# Patient Record
Sex: FEMALE | Race: WHITE | NOT HISPANIC OR LATINO | Employment: UNEMPLOYED | URBAN - NONMETROPOLITAN AREA
[De-identification: names, ages, dates, MRNs, and addresses within clinical notes are randomized per-mention and may not be internally consistent; named-entity substitution may affect disease eponyms.]

---

## 2017-02-09 ENCOUNTER — OFFICE VISIT (OUTPATIENT)
Dept: GASTROENTEROLOGY | Facility: CLINIC | Age: 27
End: 2017-02-09

## 2017-02-09 VITALS
DIASTOLIC BLOOD PRESSURE: 75 MMHG | BODY MASS INDEX: 25.27 KG/M2 | OXYGEN SATURATION: 97 % | WEIGHT: 148 LBS | SYSTOLIC BLOOD PRESSURE: 114 MMHG | HEIGHT: 64 IN | HEART RATE: 66 BPM

## 2017-02-09 DIAGNOSIS — R19.7 DIARRHEA, UNSPECIFIED TYPE: ICD-10-CM

## 2017-02-09 DIAGNOSIS — R53.83 MALAISE AND FATIGUE: Primary | ICD-10-CM

## 2017-02-09 DIAGNOSIS — K62.5 HEMORRHAGE OF ANUS AND RECTUM: ICD-10-CM

## 2017-02-09 DIAGNOSIS — R10.84 GENERALIZED ABDOMINAL PAIN: ICD-10-CM

## 2017-02-09 DIAGNOSIS — R53.81 MALAISE AND FATIGUE: Primary | ICD-10-CM

## 2017-02-09 PROCEDURE — 99244 OFF/OP CNSLTJ NEW/EST MOD 40: CPT | Performed by: INTERNAL MEDICINE

## 2017-02-09 RX ORDER — NORETHINDRONE AND ETHINYL ESTRADIOL 7 DAYS X 3
KIT ORAL
Refills: 3 | COMMUNITY
Start: 2017-01-24

## 2017-02-09 RX ORDER — IBUPROFEN 800 MG/1
800 TABLET ORAL EVERY 6 HOURS PRN
COMMUNITY
End: 2021-11-10

## 2017-02-09 RX ORDER — RANITIDINE 150 MG/1
TABLET ORAL
Refills: 4 | COMMUNITY
Start: 2016-11-02

## 2017-02-09 NOTE — PATIENT INSTRUCTIONS
BRANDIN OZUNA D.O.  Board Certified in Gastroenterology    Miralax Colonoscopy One Day Prep    Do these things 7 DAYS BEFORE the procedure:  • Arrange a ride: You will be given medicine that makes you relax and be sleepy, so you cannot drive a car or take a bus home. If you arrive without an escort, your procedure may need to be rescheduled.   • Stop taking Iron and Vitamin E  • If you are taking Coumadin, Plavix, and/or Warfarin or if you are a diabetic, call your doctor for special instructions.  • Do not eat any seeds, popcorn or nuts.  • Please inform the physician of any history of heart murmurs, valve replacement, heart or lung conditions, or if you have had any adverse reactions to anesthesia.    Do these things 3 DAYS BEFORE the procedure:  • Confirm your ride.  • If you need to cancel your appointment, call your doctor.   • Review the diet you need to follow for the next two days. Plan your meals according to the clear liquid diet.    · Start Linzess Saturday once daily 30-45mins before meal.    Do these things 1 DAY BEFORE the procedure:  • Beginning at breakfast and lasting until midnight, start a clear liquid diet, a clear liquid diet can be found below.  • It is very important that you increase your fluids throughout the day.  • 8:00 PM - Mix 32 ounces of a pre-warmed liquid of your choice, such as lemon flavored Crystal Light, apple juice, or Gatorade (except red, blue, green, or purple) with 15 capfuls of MiraLax. Mix well until the MiraLax has dissolved. DO NOT REFRIGERATE (refrigeration will cause the MiraLax to not dissolve completely.) You may pour each glass over ice after the MiraLax has been dissolved if you prefer to drink it cold. Drink entire mixture in 1 hour.     Do these things ON THE DAY OF the procedure:   • 6 hours prior to arrival mix 15 capfuls with 32 ounces of liquid and drink entire mixture in 1 hour.  • You may take any essential medications with a small amount of  water, otherwise do not eat or drink anything on the morning of your procedure. Please contact your Primary Care Physician for the insulin dose, if applicable.  • Once again, it is very important for you to bring someone with you at the time of your procedure to drive you home.  • Nothing to eat or drink 4 hours prior to procedure.    **REMINDERS**  **Please note: If you experience nausea, you may take Phenergan and/or Zofran. 1 tablet every 4-6 hours as needed.   ** Before presenting for your EGD or Colonoscopy you will need to have a shower/bath.         Clear Liquid Diet  This diet provides fluids that leave little residue and are easily absorbed with minimal digestive activity. This diet is inadequate in all essential nutrients and is recommended only if clear liquids are temporarily needed. No red, purple, blue or green liquids should be consumed.    FOOD GROUP FOODS ALLOWED FOODS TO AVOID   Milk & Beverages  No red or purple liquids! Tea, coffee, carbonated or fruit flavored drinks Milk, milk drinks   Meat & Meat substances None All   Vegetables None All   Fruits & Fruit Juices Strained fruit juices: apple, white grape, lemonade Fruit juices with fruit pulp   Grains & Starches None All   Soups Clear broth, consommé, Beef, chicken, vegetable broth All others   Desserts Clear flavored gelatin or popsicles. No red, purple, blue and green.  All others   Fats None All   Miscellaneous Sugar, honey, syrup, clear hard candy, salt All others       Breakfast Lunch Dinner   4 oz White grape juice 4 oz Apple juice 4 oz Lemonade   6 oz clear broth 6 oz Clear broth 6 oz Clear Broth   Jell-O* Jell-O* Jell-O*   Tea or Coffee Tea or Coffee Tea or Coffee   *Plain only, no fruit or toppings.    Procedure is at [facility] Date:   2/15/17       Arrival Time:     800am       Patients states to understand and to be compliant with all instructions discussed.       Thanks,  RICK Estevez

## 2017-02-09 NOTE — PROGRESS NOTES
: 1990    Chief Complaint   Patient presents with   • Abdominal Pain       Pema Luna is a 26 y.o. female who presents to the office today as a consultation from JOVON Lloyd for evaluation of Abdominal Pain    History of Present Illness:    Pema Luna presents for evaluation of abdominal pain at the request of Analisa SIGALA.  Reports in on Labor Day weekend she developed flulike symptoms with nausea, vomiting, body aches and generalized malaise.  She was evaluated and found to have no flu or strep throat.  By 11 PM that evening she was having severe diarrhea, severe abdominal pain and was concerned that she had had some food poisoning.  She was placed on Flagyl and Levaquin.     A month and a half ago she developed a severe attack with sharp stabbing pain that she rated as 6-8 out of 10, she continued to have pain and diarrhea with blood in her stools.  She noted that she also had heartburn and bloating, oral ulcers and hair loss.  She was tested and found that her vitamin D was low.  Recently she has has had 8-9 bowel movements per day.  On a normal day she would have 1-2 soft stools or diarrhea.  She will have episodes that she feels like she has to go to the bathroom it is unable to pass any stool.  She feels with change in bowel habits and her unclear diagnosis of possible Crohn's disease she would like another colonoscopy.    She was seen and [facility] 2017.  She underwent an IBD panel, and Thiopurine levels.  CBC on the same date was within normal levels, sedimentation rate 23, CMP revealed AST 11, slightly low.  She underwent a colonoscopy on 2016 that revealed transverse colon biopsy with mild active colitis with mild crypt distortion and focal cryptitis (the microscopic findings raise the possibility and suggestive of inflammatory bowel disease; however, other differential diagnosis includes an infectious colitis and drug-related  colitis; right colon biopsy showed mild active chronic colitis with cryptitis, crypt abscess and mild crypt distortion; rectal biopsy showed focal acute inflammation.  Hemoccults stools were negative, there were no white blood cells noted, several Reggie positive cocci in clusters and rare gram-positive rods.  On 9/3/2016, she underwent HIDA scan at [facility] in [facility] that revealed a gallbladder ejection fraction decreased at 20%.  Her stools were sent to the [facility] for evaluation on September 6, 2016 that revealed no parasites, leukocytes moderate, no cryptosporidium, Cyclospora or microspora; serum beta hCG was negative.  During the same time she was checked for C. difficile toxins in her stool which were negative.  The pathology for her gallbladder on September 8, 2016 revealed chronic cholecystitis.    He was seen at [facility] on December 8, 2016 that revealed the following: iron 172, TIBC 554, iron saturation 31%, C-reactive protein less than 0.2, CMP revealed total protein 8.3/6.4-8.2.    CBC on September 5, 2016 revealed hemoglobin of 10.6 and hematocrit 32.6, vitamin B12 to 19,      CT of the abdomen and pelvis with contrast at [facility] on September 3, 2016, CT of the abdomen and pelvis with contrast revealed findings initially to suggest colitis within the right questionable similar findings in the left but not marked.  There is a 2.4 cm cyst in the left kidney                         Review of Systems   Constitutional: Positive for appetite change, fatigue and unexpected weight change. Negative for chills and fever.   HENT: Positive for mouth sores. Negative for hearing loss and nosebleeds.    Eyes: Negative for itching and visual disturbance.   Respiratory: Negative for cough, chest tightness, shortness of breath and wheezing.    Cardiovascular: Negative for chest pain, palpitations and leg swelling.   Endocrine: Negative for cold  intolerance, heat intolerance, polydipsia and polyuria.   Genitourinary: Negative for dysuria, frequency and hematuria.   Musculoskeletal: Positive for arthralgias and myalgias. Negative for joint swelling.   Skin: Negative for rash and wound.   Allergic/Immunologic: Negative for food allergies and immunocompromised state.   Neurological: Negative for seizures, syncope, weakness and light-headedness.   Hematological: Negative for adenopathy. Does not bruise/bleed easily.   Psychiatric/Behavioral: Negative for confusion and sleep disturbance. The patient is not nervous/anxious.    Gastrointestinal: Positive for abdominal pain, anal bleeding, black stools, blood in stool, change in bowel habits, constipation, diarrhea, gas/bloating and heartburn.    History reviewed. No pertinent past medical history.    Past Surgical History   Procedure Laterality Date   • Colonoscopy     • Cholecystectomy         Family History   Problem Relation Age of Onset   • No Known Problems Mother    • No Known Problems Father    • Celiac disease Sister    • Diabetes Maternal Grandmother    • Hypertension Maternal Grandmother    • Lung cancer Maternal Grandfather    • Breast cancer Paternal Grandmother    • Colon cancer Neg Hx    • Colon polyps Neg Hx    • Liver disease Neg Hx        History   Smoking Status   • Never Smoker   Smokeless Tobacco   • Not on file     History   Alcohol Use   • Yes     Comment: occasional     History   Drug Use No     Marital Status:       Current Outpatient Prescriptions:   •  ibuprofen (ADVIL,MOTRIN) 800 MG tablet, Take 800 mg by mouth Every 6 (Six) Hours As Needed for mild pain (1-3)., Disp: , Rfl:   •  LORATADINE ALLERGY RELIEF PO, Take  by mouth., Disp: , Rfl:   •  NORTREL 7/7/7 0.5/0.75/1-35 MG-MCG per tablet, TAKE 1 TABLET EVERY DAY, Disp: , Rfl: 3  •  raNITIdine (ZANTAC) 150 MG tablet, TAKE 1 TABLET BY MOUTH TWICE A DAY, Disp: , Rfl: 4  •  bisacodyl (DULCOLAX) 5 MG EC tablet, Take 4 tablets at 8am  "with a full glass of water., Disp: 4 tablet, Rfl: 0  •  ondansetron (ZOFRAN) 4 MG tablet, Take 1 tablet PRN nausea every 4 hours., Disp: 5 tablet, Rfl: 0  •  polyethylene glycol (MIRALAX) packet, Take 255g of Miralax with 32 oz clear liquid at 8pm the night before the procedure. Repeat 255g Miralax 6 hrs prior to your procedure., Disp: 510 g, Rfl: 0    Allergies:   Review of patient's allergies indicates no known allergies.    Visit Vitals   • /75   • Pulse 66   • Ht 64\" (162.6 cm)   • Wt 148 lb (67.1 kg)   • SpO2 97%   • BMI 25.4 kg/m2       Physical Exam   Constitutional: She is oriented to person, place, and time. She appears well-developed and well-nourished. No distress.   HENT:   Head: Normocephalic and atraumatic.   Right Ear: External ear normal.   Left Ear: External ear normal.   Nose: Nose normal.   Mouth/Throat: Oropharynx is clear and moist.   Eyes: Conjunctivae and EOM are normal. Right eye exhibits no discharge. Left eye exhibits no discharge. No scleral icterus.   Neck: Normal range of motion. Neck supple.   Cardiovascular: Normal rate, regular rhythm and normal heart sounds.  Exam reveals no gallop and no friction rub.    No murmur heard.  Pulmonary/Chest: Effort normal and breath sounds normal. No respiratory distress. She has no wheezes. She has no rales. She exhibits no tenderness.   Abdominal: Soft. Normal appearance and bowel sounds are normal. She exhibits no distension, no ascites and no mass. There is no tenderness. There is no rigidity and no guarding. No hernia.   Musculoskeletal: Normal range of motion. She exhibits no edema or deformity.   Neurological: She is alert and oriented to person, place, and time. She exhibits normal muscle tone. Coordination normal.   Skin: Skin is warm and dry. No rash noted. No erythema. No pallor.   Psychiatric: She has a normal mood and affect. Her behavior is normal. Judgment and thought content normal.   Nursing note and vitals " reviewed.      Assessment/Plan:  Diagnoses and all orders for this visit:    Malaise and fatigue  -     Iron Profile; Future  -     Vitamin D 1,25 Dihydroxy  -     TSH; Future  -     T4, free; Future  Diarrhea, unspecified type  -     Iron Profile; Future  -     Vitamin D 1,25 Dihydroxy  -     TSH; Future  -     T4, free; Future  Hemorrhage of anus and rectum  -     Iron Profile; Future  -     Vitamin D 1,25 Dihydroxy  -     TSH; Future  -     T4, free; Future    Generalized abdominal pain  -     Iron Profile; Future  -     Vitamin D 1,25 Dihydroxy  -     TSH; Future  -     T4, free; Future    · She will need a colonoscopy performed with IV general sedation. All of the risks, benefits and alternatives of this procedure have been discussed with her, all of her questions have been answered and she has elected to proceed. She should follow up in the office after this procedure to discuss the results and further recommendations can be made at that time.  · She will continue current medications.  · I discussed the patient's findings and my recommendations with the patient. All of their questions were answered to their satisfaction and they understand the plan.   · She will call with any interval concerns.       Return for next scheduled follow up after procedure.            Electronically signed by: Maria Del Carmen Puentes D.O. 2/9/2017 at 2:18 PM  .

## 2017-02-15 RX ORDER — ONDANSETRON 4 MG/1
TABLET, FILM COATED ORAL
Qty: 5 TABLET | Refills: 0 | Status: SHIPPED | OUTPATIENT
Start: 2017-02-15

## 2017-02-15 RX ORDER — POLYETHYLENE GLYCOL 3350 17 G/17G
POWDER, FOR SOLUTION ORAL
Qty: 510 G | Refills: 0 | Status: SHIPPED | OUTPATIENT
Start: 2017-02-15

## 2017-02-22 ENCOUNTER — ANESTHESIA (OUTPATIENT)
Dept: PERIOP | Facility: HOSPITAL | Age: 27
End: 2017-02-22

## 2017-02-22 ENCOUNTER — ANESTHESIA EVENT (OUTPATIENT)
Dept: PERIOP | Facility: HOSPITAL | Age: 27
End: 2017-02-22

## 2017-02-22 ENCOUNTER — HOSPITAL ENCOUNTER (OUTPATIENT)
Facility: HOSPITAL | Age: 27
Setting detail: HOSPITAL OUTPATIENT SURGERY
Discharge: HOME | End: 2017-02-22
Attending: INTERNAL MEDICINE | Admitting: INTERNAL MEDICINE

## 2017-02-22 VITALS
HEART RATE: 65 BPM | DIASTOLIC BLOOD PRESSURE: 61 MMHG | RESPIRATION RATE: 20 BRPM | OXYGEN SATURATION: 100 % | HEIGHT: 65 IN | TEMPERATURE: 98 F | SYSTOLIC BLOOD PRESSURE: 100 MMHG | WEIGHT: 148 LBS | BODY MASS INDEX: 24.66 KG/M2

## 2017-02-22 DIAGNOSIS — R10.84 GENERALIZED ABDOMINAL PAIN: ICD-10-CM

## 2017-02-22 DIAGNOSIS — R53.83 MALAISE AND FATIGUE: ICD-10-CM

## 2017-02-22 DIAGNOSIS — R53.81 MALAISE AND FATIGUE: ICD-10-CM

## 2017-02-22 DIAGNOSIS — R19.7 DIARRHEA, UNSPECIFIED TYPE: ICD-10-CM

## 2017-02-22 DIAGNOSIS — K62.5 HEMORRHAGE OF ANUS AND RECTUM: ICD-10-CM

## 2017-02-22 LAB
B-HCG UR QL: NEGATIVE
INTERNAL NEGATIVE CONTROL: NEGATIVE
INTERNAL POSITIVE CONTROL: POSITIVE
Lab: NORMAL

## 2017-02-22 PROCEDURE — 25010000002 PROPOFOL 10 MG/ML EMULSION: Performed by: NURSE ANESTHETIST, CERTIFIED REGISTERED

## 2017-02-22 PROCEDURE — 25010000002 FENTANYL CITRATE (PF) 100 MCG/2ML SOLUTION: Performed by: NURSE ANESTHETIST, CERTIFIED REGISTERED

## 2017-02-22 PROCEDURE — 25010000002 PROPOFOL 1000 MG/ML EMULSION: Performed by: NURSE ANESTHETIST, CERTIFIED REGISTERED

## 2017-02-22 PROCEDURE — 25010000002 MIDAZOLAM PER 1 MG: Performed by: NURSE ANESTHETIST, CERTIFIED REGISTERED

## 2017-02-22 PROCEDURE — 45380 COLONOSCOPY AND BIOPSY: CPT | Performed by: INTERNAL MEDICINE

## 2017-02-22 RX ORDER — ONDANSETRON 2 MG/ML
4 INJECTION INTRAMUSCULAR; INTRAVENOUS ONCE AS NEEDED
Status: DISCONTINUED | OUTPATIENT
Start: 2017-02-22 | End: 2017-02-22 | Stop reason: HOSPADM

## 2017-02-22 RX ORDER — TRIAMCINOLONE ACETONIDE 1 MG/G
CREAM TOPICAL
Refills: 3 | COMMUNITY
Start: 2016-11-15

## 2017-02-22 RX ORDER — LIDOCAINE HYDROCHLORIDE 20 MG/ML
INJECTION, SOLUTION INFILTRATION; PERINEURAL AS NEEDED
Status: DISCONTINUED | OUTPATIENT
Start: 2017-02-22 | End: 2017-02-22 | Stop reason: SURG

## 2017-02-22 RX ORDER — SODIUM CHLORIDE 0.9 % (FLUSH) 0.9 %
1-10 SYRINGE (ML) INJECTION AS NEEDED
Status: DISCONTINUED | OUTPATIENT
Start: 2017-02-22 | End: 2017-02-22 | Stop reason: HOSPADM

## 2017-02-22 RX ORDER — FENTANYL CITRATE 50 UG/ML
50 INJECTION, SOLUTION INTRAMUSCULAR; INTRAVENOUS
Status: DISCONTINUED | OUTPATIENT
Start: 2017-02-22 | End: 2017-02-22 | Stop reason: HOSPADM

## 2017-02-22 RX ORDER — OXYCODONE HYDROCHLORIDE AND ACETAMINOPHEN 5; 325 MG/1; MG/1
1 TABLET ORAL ONCE AS NEEDED
Status: DISCONTINUED | OUTPATIENT
Start: 2017-02-22 | End: 2017-02-22 | Stop reason: HOSPADM

## 2017-02-22 RX ORDER — FENTANYL CITRATE 50 UG/ML
INJECTION, SOLUTION INTRAMUSCULAR; INTRAVENOUS AS NEEDED
Status: DISCONTINUED | OUTPATIENT
Start: 2017-02-22 | End: 2017-02-22 | Stop reason: SURG

## 2017-02-22 RX ORDER — MIDAZOLAM HYDROCHLORIDE 1 MG/ML
INJECTION INTRAMUSCULAR; INTRAVENOUS AS NEEDED
Status: DISCONTINUED | OUTPATIENT
Start: 2017-02-22 | End: 2017-02-22 | Stop reason: SURG

## 2017-02-22 RX ORDER — CYANOCOBALAMIN 1000 UG/ML
INJECTION, SOLUTION INTRAMUSCULAR; SUBCUTANEOUS
Refills: 0 | COMMUNITY
Start: 2017-01-24

## 2017-02-22 RX ORDER — PROPOFOL 10 MG/ML
VIAL (ML) INTRAVENOUS AS NEEDED
Status: DISCONTINUED | OUTPATIENT
Start: 2017-02-22 | End: 2017-02-22 | Stop reason: SURG

## 2017-02-22 RX ORDER — SODIUM CHLORIDE, SODIUM LACTATE, POTASSIUM CHLORIDE, CALCIUM CHLORIDE 600; 310; 30; 20 MG/100ML; MG/100ML; MG/100ML; MG/100ML
125 INJECTION, SOLUTION INTRAVENOUS CONTINUOUS
Status: DISCONTINUED | OUTPATIENT
Start: 2017-02-22 | End: 2017-02-22 | Stop reason: HOSPADM

## 2017-02-22 RX ORDER — IPRATROPIUM BROMIDE AND ALBUTEROL SULFATE 2.5; .5 MG/3ML; MG/3ML
3 SOLUTION RESPIRATORY (INHALATION) ONCE AS NEEDED
Status: DISCONTINUED | OUTPATIENT
Start: 2017-02-22 | End: 2017-02-22 | Stop reason: HOSPADM

## 2017-02-22 RX ORDER — MEPERIDINE HYDROCHLORIDE 25 MG/ML
12.5 INJECTION INTRAMUSCULAR; INTRAVENOUS; SUBCUTANEOUS
Status: DISCONTINUED | OUTPATIENT
Start: 2017-02-22 | End: 2017-02-22 | Stop reason: HOSPADM

## 2017-02-22 RX ADMIN — SODIUM CHLORIDE, POTASSIUM CHLORIDE, SODIUM LACTATE AND CALCIUM CHLORIDE: 600; 310; 30; 20 INJECTION, SOLUTION INTRAVENOUS at 09:47

## 2017-02-22 RX ADMIN — LIDOCAINE HYDROCHLORIDE 60 MG: 20 INJECTION, SOLUTION INFILTRATION; PERINEURAL at 09:49

## 2017-02-22 RX ADMIN — PROPOFOL 50 MG: 10 INJECTION, EMULSION INTRAVENOUS at 09:49

## 2017-02-22 RX ADMIN — MIDAZOLAM HYDROCHLORIDE 2 MG: 1 INJECTION, SOLUTION INTRAMUSCULAR; INTRAVENOUS at 09:47

## 2017-02-22 RX ADMIN — PROPOFOL 150 MCG/KG/MIN: 10 INJECTION, EMULSION INTRAVENOUS at 09:49

## 2017-02-22 RX ADMIN — FENTANYL CITRATE 100 MCG: 50 INJECTION INTRAMUSCULAR; INTRAVENOUS at 09:47

## 2017-02-22 NOTE — ANESTHESIA POSTPROCEDURE EVALUATION
Patient: Pema Luna    Procedure Summary     Date Anesthesia Start Anesthesia Stop Room / Location    02/22/17 0947 1020  [facility] 07 / [facility]       Procedure Diagnosis Surgeon Provider    COLONOSCOPY CPT CODE: 42514 (N/A ) Generalized abdominal pain; Hemorrhage of anus and rectum; Malaise and fatigue; Diarrhea, unspecified type  (Generalized abdominal pain [R10.84]; Hemorrhage of anus and rectum [K62.5]; Malaise and fatigue [R53.81, R53.83]; Diarrhea, unspecified type [R19.7]) DO Adam Beltran MD          Anesthesia Type: general  Last vitals  BP      Temp      Pulse     Resp      SpO2        Post Anesthesia Care and Evaluation    Patient location during evaluation: PHASE II  Patient participation: complete - patient participated  Level of consciousness: awake and alert  Pain score: 1  Pain management: adequate  Airway patency: patent  Anesthetic complications: No anesthetic complications  PONV Status: controlled  Cardiovascular status: acceptable  Respiratory status: acceptable  Hydration status: acceptable

## 2017-02-22 NOTE — ANESTHESIA PREPROCEDURE EVALUATION
Anesthesia Evaluation     Patient summary reviewed and Nursing notes reviewed   no history of anesthetic complications:  NPO Status: > 6 hours   Airway   Mallampati: I  TM distance: >3 FB  Neck ROM: full  no difficulty expected  Dental - normal exam     Pulmonary - negative pulmonary ROS and normal exam   (-) asthma, not a smoker  Cardiovascular - negative cardio ROS and normal exam  Exercise tolerance: good (4-7 METS)    NYHA Classification: II    (-) hypertension, past MI, dysrhythmias, angina, CHF      Neuro/Psych- negative ROS  (-) seizures, CVA  GI/Hepatic/Renal/Endo    (+)  GERD,   (-) diabetes, hypothyroidism    Musculoskeletal (-) negative ROS    Abdominal  - normal exam    Bowel sounds: normal.   Substance History - negative use     OB/GYN negative ob/gyn ROS         Other - negative ROS       (-) arthritis                              Anesthesia Plan    ASA 2     general     intravenous induction   Anesthetic plan and risks discussed with patient and spouse/significant other.  Use of blood products discussed with patient and spouse/significant other  Consented to blood products.

## 2017-02-22 NOTE — PLAN OF CARE
Problem: Patient Care Overview (Adult)  Goal: Plan of Care Review  Outcome: Ongoing (interventions implemented as appropriate)    02/22/17 0959   Coping/Psychosocial Response Interventions   Plan Of Care Reviewed With patient   Patient Care Overview   Progress progress toward functional goals as expected

## 2017-02-22 NOTE — PLAN OF CARE
Problem: Patient Care Overview (Adult)  Goal: Discharge Needs Assessment  Outcome: Ongoing (interventions implemented as appropriate)    02/22/17 0951   Discharge Needs Assessment   Concerns To Be Addressed no discharge needs identified

## 2017-02-22 NOTE — PLAN OF CARE
Problem: GI Endoscopy (Adult)  Goal: Signs and Symptoms of Listed Potential Problems Will be Absent or Manageable (GI Endoscopy)  Outcome: Ongoing (interventions implemented as appropriate)    02/22/17 0951   GI Endoscopy   Problems Assessed (GI Endoscopy) all   Problems Present (GI Endoscopy) none

## 2017-02-22 NOTE — H&P (VIEW-ONLY)
: 1990    Chief Complaint   Patient presents with   • Abdominal Pain       Pema Luna is a 26 y.o. female who presents to the office today as a consultation from JOVON Lloyd for evaluation of Abdominal Pain    History of Present Illness:    Pema Luna presents for evaluation of abdominal pain at the request of Analisa SIGALA.  Reports in on Labor Day weekend she developed flulike symptoms with nausea, vomiting, body aches and generalized malaise.  She was evaluated and found to have no flu or strep throat.  By 11 PM that evening she was having severe diarrhea, severe abdominal pain and was concerned that she had had some food poisoning.  She was placed on Flagyl and Levaquin.     A month and a half ago she developed a severe attack with sharp stabbing pain that she rated as 6-8 out of 10, she continued to have pain and diarrhea with blood in her stools.  She noted that she also had heartburn and bloating, oral ulcers and hair loss.  She was tested and found that her vitamin D was low.  Recently she has has had 8-9 bowel movements per day.  On a normal day she would have 1-2 soft stools or diarrhea.  She will have episodes that she feels like she has to go to the bathroom it is unable to pass any stool.  She feels with change in bowel habits and her unclear diagnosis of possible Crohn's disease she would like another colonoscopy.    She was seen and [facility] 2017.  She underwent an IBD panel, and Thiopurine levels.  CBC on the same date was within normal levels, sedimentation rate 23, CMP revealed AST 11, slightly low.  She underwent a colonoscopy on 2016 that revealed transverse colon biopsy with mild active colitis with mild crypt distortion and focal cryptitis (the microscopic findings raise the possibility and suggestive of inflammatory bowel disease; however, other differential diagnosis includes an infectious colitis and drug-related  colitis; right colon biopsy showed mild active chronic colitis with cryptitis, crypt abscess and mild crypt distortion; rectal biopsy showed focal acute inflammation.  Hemoccults stools were negative, there were no white blood cells noted, several Reggie positive cocci in clusters and rare gram-positive rods.  On 9/3/2016, she underwent HIDA scan at [facility] in [facility] that revealed a gallbladder ejection fraction decreased at 20%.  Her stools were sent to the [facility] for evaluation on September 6, 2016 that revealed no parasites, leukocytes moderate, no cryptosporidium, Cyclospora or microspora; serum beta hCG was negative.  During the same time she was checked for C. difficile toxins in her stool which were negative.  The pathology for her gallbladder on September 8, 2016 revealed chronic cholecystitis.    He was seen at [facility] on December 8, 2016 that revealed the following: iron 172, TIBC 554, iron saturation 31%, C-reactive protein less than 0.2, CMP revealed total protein 8.3/6.4-8.2.    CBC on September 5, 2016 revealed hemoglobin of 10.6 and hematocrit 32.6, vitamin B12 to 19,      CT of the abdomen and pelvis with contrast at [facility] on September 3, 2016, CT of the abdomen and pelvis with contrast revealed findings initially to suggest colitis within the right questionable similar findings in the left but not marked.  There is a 2.4 cm cyst in the left kidney                         Review of Systems   Constitutional: Positive for appetite change, fatigue and unexpected weight change. Negative for chills and fever.   HENT: Positive for mouth sores. Negative for hearing loss and nosebleeds.    Eyes: Negative for itching and visual disturbance.   Respiratory: Negative for cough, chest tightness, shortness of breath and wheezing.    Cardiovascular: Negative for chest pain, palpitations and leg swelling.   Endocrine: Negative for cold  intolerance, heat intolerance, polydipsia and polyuria.   Genitourinary: Negative for dysuria, frequency and hematuria.   Musculoskeletal: Positive for arthralgias and myalgias. Negative for joint swelling.   Skin: Negative for rash and wound.   Allergic/Immunologic: Negative for food allergies and immunocompromised state.   Neurological: Negative for seizures, syncope, weakness and light-headedness.   Hematological: Negative for adenopathy. Does not bruise/bleed easily.   Psychiatric/Behavioral: Negative for confusion and sleep disturbance. The patient is not nervous/anxious.    Gastrointestinal: Positive for abdominal pain, anal bleeding, black stools, blood in stool, change in bowel habits, constipation, diarrhea, gas/bloating and heartburn.    History reviewed. No pertinent past medical history.    Past Surgical History   Procedure Laterality Date   • Colonoscopy     • Cholecystectomy         Family History   Problem Relation Age of Onset   • No Known Problems Mother    • No Known Problems Father    • Celiac disease Sister    • Diabetes Maternal Grandmother    • Hypertension Maternal Grandmother    • Lung cancer Maternal Grandfather    • Breast cancer Paternal Grandmother    • Colon cancer Neg Hx    • Colon polyps Neg Hx    • Liver disease Neg Hx        History   Smoking Status   • Never Smoker   Smokeless Tobacco   • Not on file     History   Alcohol Use   • Yes     Comment: occasional     History   Drug Use No     Marital Status:       Current Outpatient Prescriptions:   •  ibuprofen (ADVIL,MOTRIN) 800 MG tablet, Take 800 mg by mouth Every 6 (Six) Hours As Needed for mild pain (1-3)., Disp: , Rfl:   •  LORATADINE ALLERGY RELIEF PO, Take  by mouth., Disp: , Rfl:   •  NORTREL 7/7/7 0.5/0.75/1-35 MG-MCG per tablet, TAKE 1 TABLET EVERY DAY, Disp: , Rfl: 3  •  raNITIdine (ZANTAC) 150 MG tablet, TAKE 1 TABLET BY MOUTH TWICE A DAY, Disp: , Rfl: 4  •  bisacodyl (DULCOLAX) 5 MG EC tablet, Take 4 tablets at 8am  "with a full glass of water., Disp: 4 tablet, Rfl: 0  •  ondansetron (ZOFRAN) 4 MG tablet, Take 1 tablet PRN nausea every 4 hours., Disp: 5 tablet, Rfl: 0  •  polyethylene glycol (MIRALAX) packet, Take 255g of Miralax with 32 oz clear liquid at 8pm the night before the procedure. Repeat 255g Miralax 6 hrs prior to your procedure., Disp: 510 g, Rfl: 0    Allergies:   Review of patient's allergies indicates no known allergies.    Visit Vitals   • /75   • Pulse 66   • Ht 64\" (162.6 cm)   • Wt 148 lb (67.1 kg)   • SpO2 97%   • BMI 25.4 kg/m2       Physical Exam   Constitutional: She is oriented to person, place, and time. She appears well-developed and well-nourished. No distress.   HENT:   Head: Normocephalic and atraumatic.   Right Ear: External ear normal.   Left Ear: External ear normal.   Nose: Nose normal.   Mouth/Throat: Oropharynx is clear and moist.   Eyes: Conjunctivae and EOM are normal. Right eye exhibits no discharge. Left eye exhibits no discharge. No scleral icterus.   Neck: Normal range of motion. Neck supple.   Cardiovascular: Normal rate, regular rhythm and normal heart sounds.  Exam reveals no gallop and no friction rub.    No murmur heard.  Pulmonary/Chest: Effort normal and breath sounds normal. No respiratory distress. She has no wheezes. She has no rales. She exhibits no tenderness.   Abdominal: Soft. Normal appearance and bowel sounds are normal. She exhibits no distension, no ascites and no mass. There is no tenderness. There is no rigidity and no guarding. No hernia.   Musculoskeletal: Normal range of motion. She exhibits no edema or deformity.   Neurological: She is alert and oriented to person, place, and time. She exhibits normal muscle tone. Coordination normal.   Skin: Skin is warm and dry. No rash noted. No erythema. No pallor.   Psychiatric: She has a normal mood and affect. Her behavior is normal. Judgment and thought content normal.   Nursing note and vitals " reviewed.      Assessment/Plan:  Diagnoses and all orders for this visit:    Malaise and fatigue  -     Iron Profile; Future  -     Vitamin D 1,25 Dihydroxy  -     TSH; Future  -     T4, free; Future  Diarrhea, unspecified type  -     Iron Profile; Future  -     Vitamin D 1,25 Dihydroxy  -     TSH; Future  -     T4, free; Future  Hemorrhage of anus and rectum  -     Iron Profile; Future  -     Vitamin D 1,25 Dihydroxy  -     TSH; Future  -     T4, free; Future    Generalized abdominal pain  -     Iron Profile; Future  -     Vitamin D 1,25 Dihydroxy  -     TSH; Future  -     T4, free; Future    · She will need a colonoscopy performed with IV general sedation. All of the risks, benefits and alternatives of this procedure have been discussed with her, all of her questions have been answered and she has elected to proceed. She should follow up in the office after this procedure to discuss the results and further recommendations can be made at that time.  · She will continue current medications.  · I discussed the patient's findings and my recommendations with the patient. All of their questions were answered to their satisfaction and they understand the plan.   · She will call with any interval concerns.       Return for next scheduled follow up after procedure.            Electronically signed by: Maria Del Carmen Puentes D.O. 2/9/2017 at 2:18 PM  .

## 2017-02-22 NOTE — OP NOTE
Colonoscopy Procedure Note      Pre-operative Diagnosis:     Diarrhea  Hemorrhage of rectum  Generalized abdominal pain  Malaise and fatigue    Post-operative Diagnosis:     Random colon biopsies  Random terminal ileus biopsies    Procedure: Colonoscopy with biopsies    Sedation: IV General    ASA Class: 2    Procedure Details     Informed consent was obtained for the procedure, including sedation.  Risks of perforation, hemorrhage, adverse drug reaction and aspiration were discussed. The patient was placed in the left lateral decubitus position.  Based on the pre-procedure assessment, including review of the patient's medical history, medications, allergies, and review of systems, she had been deemed to be an appropriate candidate for conscious sedation; she was therefore sedated with the medications listed below.   The patient was monitored continuously with ECG tracing, pulse oximetry, blood pressure monitoring, and direct observations.      A rectal examination was performed.  The variable-stiffness pediatric colonoscope was inserted into the rectum and advanced under direct vision to the cecum, which was identified by the ileocecal valve and appendiceal orifice.  The quality of the colonic preparation was adequate.  A careful inspection was made as the colonoscope was withdrawn, including a retroflexed view of the rectum; findings and interventions are described below.  Appropriate photodocumentation was obtained.    Findings:    Cecum and ileocecal valve: Normal mucosa    Ascending: Normal mucosa    Transverse:  Normal mucosa    Descending: Normal mucosa    Sigmoid: Normal mucosa    Rectal: Normal mucosa    Specimens:     Terminal ileum biopsies  Random Colon biopsies           Complications:  None; patient tolerated the procedure well.           Disposition: PACU - hemodynamically stable.           Condition: stable    Recommendations:    Discharge summary: The patient remained stable throughout the stay in  PACU. There were no immediate complications. The patient was discharged home in stable condition.  Findings were discussed with the patient at the bedside.  The patient will advance their diet as tolerated.  The patient will have light activity for the next 24 hours. The patient will have a repeat colonoscopy to be determined after the pathology is reviewed. The patient will continue their current medications.  I have asked the patient to call with any interval concerns.  Thank you for allowing me to participate in the care of your patient.          Electronically signed by: Maria Del Carmen Puentes D.O. 2/22/2017 at 10:59 AM

## 2017-02-23 LAB
LAB AP CASE REPORT: NORMAL
Lab: NORMAL
PATH REPORT.FINAL DX SPEC: NORMAL

## 2017-03-21 DIAGNOSIS — R53.83 MALAISE AND FATIGUE: Primary | ICD-10-CM

## 2017-03-21 DIAGNOSIS — K62.5 HEMORRHAGE OF ANUS AND RECTUM: ICD-10-CM

## 2017-03-21 DIAGNOSIS — R10.84 GENERALIZED ABDOMINAL PAIN: ICD-10-CM

## 2017-03-21 DIAGNOSIS — R53.81 MALAISE AND FATIGUE: Primary | ICD-10-CM

## 2017-03-21 DIAGNOSIS — R19.7 DIARRHEA, UNSPECIFIED TYPE: ICD-10-CM

## 2017-03-27 DIAGNOSIS — R53.81 MALAISE AND FATIGUE: Primary | ICD-10-CM

## 2017-03-27 DIAGNOSIS — R19.7 DIARRHEA, UNSPECIFIED TYPE: ICD-10-CM

## 2017-03-27 DIAGNOSIS — R53.83 MALAISE AND FATIGUE: Primary | ICD-10-CM

## 2017-03-27 DIAGNOSIS — K62.5 HEMORRHAGE OF ANUS AND RECTUM: ICD-10-CM

## 2017-03-27 DIAGNOSIS — R10.84 GENERALIZED ABDOMINAL PAIN: ICD-10-CM

## 2021-10-27 ENCOUNTER — TRANSCRIBE ORDERS (OUTPATIENT)
Dept: ADMINISTRATIVE | Facility: HOSPITAL | Age: 31
End: 2021-10-27

## 2021-10-27 ENCOUNTER — HOSPITAL ENCOUNTER (OUTPATIENT)
Dept: GENERAL RADIOLOGY | Facility: HOSPITAL | Age: 31
Discharge: HOME | End: 2021-10-27
Admitting: FAMILY MEDICINE

## 2021-10-27 DIAGNOSIS — M54.50 LOW BACK PAIN, UNSPECIFIED BACK PAIN LATERALITY, UNSPECIFIED CHRONICITY, UNSPECIFIED WHETHER SCIATICA PRESENT: ICD-10-CM

## 2021-10-27 DIAGNOSIS — M54.50 LOW BACK PAIN, UNSPECIFIED BACK PAIN LATERALITY, UNSPECIFIED CHRONICITY, UNSPECIFIED WHETHER SCIATICA PRESENT: Primary | ICD-10-CM

## 2021-10-27 PROCEDURE — 72110 X-RAY EXAM L-2 SPINE 4/>VWS: CPT | Performed by: RADIOLOGY

## 2021-10-27 PROCEDURE — 72110 X-RAY EXAM L-2 SPINE 4/>VWS: CPT

## 2021-11-10 ENCOUNTER — APPOINTMENT (OUTPATIENT)
Dept: CT IMAGING | Facility: HOSPITAL | Age: 31
End: 2021-11-10

## 2021-11-10 ENCOUNTER — HOSPITAL ENCOUNTER (EMERGENCY)
Facility: HOSPITAL | Age: 31
Discharge: HOME | End: 2021-11-10
Attending: EMERGENCY MEDICINE | Admitting: EMERGENCY MEDICINE

## 2021-11-10 VITALS
HEIGHT: 66 IN | BODY MASS INDEX: 26.52 KG/M2 | RESPIRATION RATE: 18 BRPM | TEMPERATURE: 98 F | OXYGEN SATURATION: 99 % | HEART RATE: 77 BPM | SYSTOLIC BLOOD PRESSURE: 151 MMHG | WEIGHT: 165 LBS | DIASTOLIC BLOOD PRESSURE: 91 MMHG

## 2021-11-10 DIAGNOSIS — M54.40 ACUTE LOW BACK PAIN WITH SCIATICA, SCIATICA LATERALITY UNSPECIFIED, UNSPECIFIED BACK PAIN LATERALITY: Primary | ICD-10-CM

## 2021-11-10 LAB — B-HCG UR QL: NEGATIVE

## 2021-11-10 PROCEDURE — 99283 EMERGENCY DEPT VISIT LOW MDM: CPT

## 2021-11-10 PROCEDURE — 72131 CT LUMBAR SPINE W/O DYE: CPT | Performed by: RADIOLOGY

## 2021-11-10 PROCEDURE — 72131 CT LUMBAR SPINE W/O DYE: CPT

## 2021-11-10 PROCEDURE — 25010000002 KETOROLAC TROMETHAMINE PER 15 MG: Performed by: PHYSICIAN ASSISTANT

## 2021-11-10 PROCEDURE — 25010000002 DEXAMETHASONE PER 1 MG: Performed by: PHYSICIAN ASSISTANT

## 2021-11-10 PROCEDURE — 81025 URINE PREGNANCY TEST: CPT | Performed by: PHYSICIAN ASSISTANT

## 2021-11-10 PROCEDURE — 96372 THER/PROPH/DIAG INJ SC/IM: CPT

## 2021-11-10 RX ORDER — NAPROXEN 500 MG/1
500 TABLET ORAL 2 TIMES DAILY PRN
Qty: 12 TABLET | Refills: 0 | Status: SHIPPED | OUTPATIENT
Start: 2021-11-10

## 2021-11-10 RX ORDER — DEXAMETHASONE SODIUM PHOSPHATE 4 MG/ML
8 INJECTION, SOLUTION INTRA-ARTICULAR; INTRALESIONAL; INTRAMUSCULAR; INTRAVENOUS; SOFT TISSUE ONCE
Status: COMPLETED | OUTPATIENT
Start: 2021-11-10 | End: 2021-11-10

## 2021-11-10 RX ORDER — CYCLOBENZAPRINE HCL 10 MG
10 TABLET ORAL 3 TIMES DAILY PRN
Qty: 15 TABLET | Refills: 0 | Status: SHIPPED | OUTPATIENT
Start: 2021-11-10

## 2021-11-10 RX ORDER — KETOROLAC TROMETHAMINE 30 MG/ML
60 INJECTION, SOLUTION INTRAMUSCULAR; INTRAVENOUS ONCE
Status: COMPLETED | OUTPATIENT
Start: 2021-11-10 | End: 2021-11-10

## 2021-11-10 RX ADMIN — DEXAMETHASONE SODIUM PHOSPHATE 8 MG: 4 INJECTION, SOLUTION INTRA-ARTICULAR; INTRALESIONAL; INTRAMUSCULAR; INTRAVENOUS; SOFT TISSUE at 18:54

## 2021-11-10 RX ADMIN — KETOROLAC TROMETHAMINE 60 MG: 60 INJECTION, SOLUTION INTRAMUSCULAR at 18:53

## 2021-11-15 NOTE — ED PROVIDER NOTES
Subjective   Patient presents to [facility] with low back pain      Back Pain  Location:  Lumbar spine  Quality:  Aching and burning  Radiates to:  R thigh  Pain severity:  Moderate  Onset quality:  Sudden  Chronicity:  New  Context comment:  Fell on rock      Review of Systems   Constitutional: Positive for activity change.   HENT: Negative.    Eyes: Negative.    Respiratory: Negative.    Cardiovascular: Negative.    Gastrointestinal: Negative.    Endocrine: Negative.    Genitourinary: Negative.    Musculoskeletal: Positive for back pain.   Allergic/Immunologic: Negative.    Hematological: Negative.    Psychiatric/Behavioral: Negative.        No past medical history on file.    No Known Allergies    Past Surgical History:   Procedure Laterality Date   • CHOLECYSTECTOMY     • COLONOSCOPY     • COLONOSCOPY N/A 2/22/2017    Procedure: COLONOSCOPY CPT CODE: 99734;  Surgeon: Maria Del Carmen Puentes DO;  Location: [facility] OR;  Service:        Family History   Problem Relation Age of Onset   • No Known Problems Mother    • No Known Problems Father    • Celiac disease Sister    • Diabetes Maternal Grandmother    • Hypertension Maternal Grandmother    • Lung cancer Maternal Grandfather    • Breast cancer Paternal Grandmother    • Colon cancer Neg Hx    • Colon polyps Neg Hx    • Liver disease Neg Hx        Social History     Socioeconomic History   • Marital status:    Tobacco Use   • Smoking status: Never Smoker   Substance and Sexual Activity   • Alcohol use: Yes     Comment: occasional   • Drug use: No   • Sexual activity: Defer           Objective   Physical Exam  Vitals and nursing note reviewed.   Constitutional:       Appearance: Normal appearance.   HENT:      Head: Normocephalic.      Nose: Nose normal.      Mouth/Throat:      Mouth: Mucous membranes are moist.   Eyes:      Pupils: Pupils are equal, round, and reactive to light.   Cardiovascular:      Rate and Rhythm: Normal rate.      Pulses: Normal pulses.   Pulmonary:       Effort: Pulmonary effort is normal.   Abdominal:      General: Abdomen is flat.   Musculoskeletal:      Cervical back: Normal range of motion.      Comments: Tender lower back to palpation   Skin:     General: Skin is warm.      Capillary Refill: Capillary refill takes less than 2 seconds.   Neurological:      General: No focal deficit present.      Mental Status: She is alert.   Psychiatric:         Mood and Affect: Mood normal.         Procedures           ED Course                                           MDM    Final diagnoses:   Acute low back pain with sciatica, sciatica laterality unspecified, unspecified back pain laterality       ED Disposition  ED Disposition     ED Disposition Condition Comment    Discharge Stable           Froylan Cordova MD  [address] [place] [address] [id number] [phone]    In 1 day  call in am         Medication List      New Prescriptions    cyclobenzaprine 10 MG tablet  Commonly known as: FLEXERIL  Take 1 tablet by mouth 3 (Three) Times a Day As Needed for Muscle Spasms for up to 15 doses.     naproxen 500 MG EC tablet  Commonly known as: EC NAPROSYN  Take 1 tablet by mouth 2 (Two) Times a Day As Needed for Mild Pain .        Stop    ibuprofen 800 MG tablet  Commonly known as: ADVILMOTRIN           Where to Get Your Medications      These medications were sent to [address] [place], [place] - [address] [address] AT [facility] [address]  - [phone] [facility] [phone]   [id number] [place] [address]    Phone: [phone]   · cyclobenzaprine 10 MG tablet  · naproxen 500 MG EC tablet          Willy Marcos MD  11/20/21 1194

## (undated) DEVICE — Device: Brand: DEFENDO AIR/WATER/SUCTION AND BIOPSY VALVE

## (undated) DEVICE — Device

## (undated) DEVICE — GOWN,REINF,POLY,ECL,PP SLV,XL: Brand: MEDLINE

## (undated) DEVICE — CONN Y IRR DISP 1P/U

## (undated) DEVICE — TUBING, SUCTION, 3/16" X 10', STRAIGHT: Brand: MEDLINE

## (undated) DEVICE — Device: Brand: ENDOGATOR

## (undated) DEVICE — HOLDER: Brand: DEROYAL

## (undated) DEVICE — SINGLE PORT MANIFOLD: Brand: NEPTUNE 2

## (undated) DEVICE — FRCP BX RADJAW4 NDL 2.8 240CM LG OG BX40

## (undated) DEVICE — SYR LUERLOK 30CC

## (undated) DEVICE — TUBING, SUCTION, 1/4" X 20', STRAIGHT: Brand: MEDLINE INDUSTRIES, INC.